# Patient Record
Sex: MALE | Race: WHITE | NOT HISPANIC OR LATINO | Employment: FULL TIME | URBAN - METROPOLITAN AREA
[De-identification: names, ages, dates, MRNs, and addresses within clinical notes are randomized per-mention and may not be internally consistent; named-entity substitution may affect disease eponyms.]

---

## 2022-08-31 ENCOUNTER — HOSPITAL ENCOUNTER (EMERGENCY)
Facility: CLINIC | Age: 22
Discharge: HOME | End: 2022-08-31
Attending: EMERGENCY MEDICINE | Admitting: EMERGENCY MEDICINE
Payer: COMMERCIAL

## 2022-08-31 VITALS
DIASTOLIC BLOOD PRESSURE: 82 MMHG | OXYGEN SATURATION: 96 % | BODY MASS INDEX: 23.72 KG/M2 | TEMPERATURE: 97.7 F | HEART RATE: 76 BPM | SYSTOLIC BLOOD PRESSURE: 136 MMHG | RESPIRATION RATE: 19 BRPM | WEIGHT: 205 LBS | HEIGHT: 78 IN

## 2022-08-31 DIAGNOSIS — F41.0 ANXIETY ATTACK: ICD-10-CM

## 2022-08-31 PROCEDURE — 99283 EMERGENCY DEPT VISIT LOW MDM: CPT

## 2022-08-31 PROCEDURE — 93005 ELECTROCARDIOGRAM TRACING: CPT | Performed by: EMERGENCY MEDICINE

## 2022-08-31 RX ORDER — HYDROXYZINE PAMOATE 50 MG/1
50 CAPSULE ORAL 3 TIMES DAILY PRN
Qty: 15 CAPSULE | Refills: 0 | Status: SHIPPED | OUTPATIENT
Start: 2022-08-31 | End: 2023-06-06

## 2022-09-01 LAB
ATRIAL RATE - MUSE: 64 BPM
DIASTOLIC BLOOD PRESSURE - MUSE: NORMAL MMHG
INTERPRETATION ECG - MUSE: NORMAL
P AXIS - MUSE: 53 DEGREES
PR INTERVAL - MUSE: 150 MS
QRS DURATION - MUSE: 98 MS
QT - MUSE: 442 MS
QTC - MUSE: 455 MS
R AXIS - MUSE: 42 DEGREES
SYSTOLIC BLOOD PRESSURE - MUSE: NORMAL MMHG
T AXIS - MUSE: 2 DEGREES
VENTRICULAR RATE- MUSE: 64 BPM

## 2022-09-01 NOTE — ED TRIAGE NOTES
Pt presents with c/o panic attack from increased stressors in life. Pt is not seeking treatment for anxiety. ABCs intact.      Triage Assessment     Row Name 08/31/22 1950       Triage Assessment (Adult)    Airway WDL WDL       Respiratory WDL    Respiratory WDL WDL       Skin Circulation/Temperature WDL    Skin Circulation/Temperature WDL WDL       Cardiac WDL    Cardiac WDL WDL       Peripheral/Neurovascular WDL    Peripheral Neurovascular WDL WDL       Cognitive/Neuro/Behavioral WDL    Cognitive/Neuro/Behavioral WDL WDL    Row Name 08/31/22 1947       Triage Assessment (Adult)    Airway WDL WDL       Respiratory WDL    Respiratory WDL WDL       Skin Circulation/Temperature WDL    Skin Circulation/Temperature WDL WDL       Cardiac WDL    Cardiac WDL WDL       Peripheral/Neurovascular WDL    Peripheral Neurovascular WDL WDL       Cognitive/Neuro/Behavioral WDL    Cognitive/Neuro/Behavioral WDL WDL

## 2022-09-01 NOTE — DISCHARGE INSTRUCTIONS
You were seen in the emergency department at [facility] after an episode of breathing difficulties and numbness.  We do think you are episode was consistent with a panic attack.  Your EKG here was normal.  We are glad that you are feeling better.  If you have recurrent symptoms you can try taking hydroxyzine as needed.  Try to focus on slow steady breathing.  If you continue to have attacks like this it would be a good idea to discuss things with primary care further.  If you have any other immediate concerns like new severe chest pain, sustained difficulty breathing at rest, lightheadedness to the point of passing out, etc. we can reevaluate you at any time in the emergency department.

## 2022-09-01 NOTE — ED PROVIDER NOTES
EMERGENCY DEPARTMENT ENCOUNTER      NAME: Silvio Stone  AGE: 22 year old male  YOB: 2000  MRN: [id number]  EVALUATION DATE & TIME: 2022  9:09 PM    PCP: No Ref-Primary, Physician    ED PROVIDER: Darryl Wisdom M.D.      Chief Complaint   Patient presents with     Panic Attack       FINAL IMPRESSION:  1. Anxiety attack        ED COURSE & MEDICAL DECISION MAKIN:24 PM I met with the patient, obtained history, performed an initial exam, and discussed options and plan for diagnostics and treatment here in the ED.   9:45 PM EKG unremarkable.  9:52 PM Patient ready for discharge.    22 year old male presents to the Emergency Department for evaluation of anxiety.  Patient is vitally stable and nontoxic-appearing when he arrives to the emergency department.  He has a history of 1 previous episode of what sounds like a panic attack.  Had a similar episode today in the setting of increased stress from work.  He arrives to the emergency department and his symptoms have essentially resolved.  His EKG shows sinus rhythm with no conduction disturbances.  Cardiopulmonary exam is unremarkable.  He feels completely better.  I do think his symptoms are consistent with a panic attack causing hyperventilation and paresthesias in his arms and legs.  Since he is completely well at this time, I do not suspect any other kind of acute cardiovascular or neurological process.  He was agreeable to trial of Vistaril at home for recurrent symptoms and follow-up in clinic.    At the conclusion of the encounter I discussed the results of all of the tests and the disposition. The questions were answered. The patient or family acknowledged understanding and was agreeable with the care plan.       MEDICATIONS GIVEN IN THE EMERGENCY:  Medications - No data to display    NEW PRESCRIPTIONS STARTED AT TODAY'S ER VISIT  Discharge Medication List as of 2022  9:52 PM      START taking these medications    Details  "  hydrOXYzine (VISTARIL) 50 MG capsule Take 1 capsule (50 mg) by mouth 3 times daily as needed for itching, Disp-15 capsule, R-0, Local Print                =================================================================    HPI    Patient information was obtained from: Patient    Use of : N/A         Silvio Stone is a 22 year old male with a pertinent history of a panic attack who presents to this ED via wheelchair for evaluation of a panic attack.    Patient reports a panic attack this evening caused by increased stressors in life and triggered by anxiety after work tonight. He notes having one other panic attack in his life that occurred within the last year. Patient reports tonight he had numbness in his hands and feet that has since gone away with shortness of breath from hyperventilating. He denies chest pain. Patient endorses palpitations during the episode. No alcohol or drug use. No other current complaints.      REVIEW OF SYSTEMS   All systems reviewed and negative except as noted in HPI.    PAST MEDICAL HISTORY:  No past medical history on file.    PAST SURGICAL HISTORY:  No past surgical history on file.        CURRENT MEDICATIONS:    No current facility-administered medications for this encounter.     Current Outpatient Medications   Medication     hydrOXYzine (VISTARIL) 50 MG capsule         ALLERGIES:  No Known Allergies    FAMILY HISTORY:  No family history on file.    SOCIAL HISTORY:   Social History     Socioeconomic History     Marital status: Single       VITALS:  /82   Pulse 76   Temp 97.7  F (36.5  C) (Temporal)   Resp 19   Ht 2.083 m (6' 10\")   Wt 93 kg (205 lb)   SpO2 96%   BMI 21.44 kg/m      PHYSICAL EXAM    Constitutional: Well developed, Well nourished, NAD.  HENT: Normocephalic, Atraumatic. Neck Supple.  Eyes: EOMI, Conjunctiva normal.  Respiratory: Breathing comfortably on room air. Speaks full sentences easily. Lungs clear to ascultation.  Cardiovascular: " Normal heart rate, Regular rhythm. No peripheral edema.  Abdomen: Soft, nontender  Musculoskeletal: Good range of motion in all major joints. No major deformities noted.  Integument: Warm, Dry.  Neurologic: Alert & awake, Normal motor function, Normal sensory function, No focal deficits noted.   Psychiatric: Cooperative. Affect appropriate.     LAB:  All pertinent labs reviewed and interpreted.  Labs Ordered and Resulted from Time of ED Arrival to Time of ED Departure - No data to display    RADIOLOGY:  Reviewed all pertinent imaging. Please see official radiology report.  No orders to display       EKG:    Performed at: 21:42    Impression: sinus rhythm    Rate: 64 bpm  Rhythm: sinus rhythm  Axis: 42  [place] Interval: 150 ms  QRS Interval: 98 ms  QTc Interval: 455 ms  ST Changes: none  Comparison: no previous ECGs available.    I have independently reviewed and interpreted the EKG(s) documented above.      I, Rosette Baird, am serving as a scribe to document services personally performed by Dr. Darryl Wisdom, based on my observation and the provider's statements to me. I, Darryl Wisdom MD attest that Rosette Baird is acting in a scribe capacity, has observed my performance of the services and has documented them in accordance with my direction.    Darryl Wisdom M.D.  Emergency Medicine  [facility] EMERGENCY ROOM  [address] [facility] [address] [phone]  Dept: [phone]     Darryl Wisdom MD  08/31/22 5534

## 2022-09-28 ENCOUNTER — OFFICE VISIT (OUTPATIENT)
Dept: FAMILY MEDICINE | Facility: CLINIC | Age: 22
End: 2022-09-28
Payer: COMMERCIAL

## 2022-09-28 VITALS
WEIGHT: 214 LBS | BODY MASS INDEX: 28.36 KG/M2 | RESPIRATION RATE: 18 BRPM | OXYGEN SATURATION: 98 % | HEIGHT: 73 IN | TEMPERATURE: 98 F | SYSTOLIC BLOOD PRESSURE: 124 MMHG | DIASTOLIC BLOOD PRESSURE: 82 MMHG | HEART RATE: 76 BPM

## 2022-09-28 DIAGNOSIS — Z00.00 ANNUAL PHYSICAL EXAM: Primary | ICD-10-CM

## 2022-09-28 DIAGNOSIS — Z13.220 SCREENING FOR HYPERLIPIDEMIA: ICD-10-CM

## 2022-09-28 DIAGNOSIS — F41.9 ANXIETY: ICD-10-CM

## 2022-09-28 LAB
CHOLEST SERPL-MCNC: 157 MG/DL
HDLC SERPL-MCNC: 53 MG/DL
LDLC SERPL CALC-MCNC: 94 MG/DL
NONHDLC SERPL-MCNC: 104 MG/DL
TRIGL SERPL-MCNC: 49 MG/DL

## 2022-09-28 PROCEDURE — 80061 LIPID PANEL: CPT | Performed by: PHYSICIAN ASSISTANT

## 2022-09-28 PROCEDURE — 36415 COLL VENOUS BLD VENIPUNCTURE: CPT | Performed by: PHYSICIAN ASSISTANT

## 2022-09-28 PROCEDURE — 99385 PREV VISIT NEW AGE 18-39: CPT | Performed by: PHYSICIAN ASSISTANT

## 2022-09-28 ASSESSMENT — PATIENT HEALTH QUESTIONNAIRE - PHQ9
10. IF YOU CHECKED OFF ANY PROBLEMS, HOW DIFFICULT HAVE THESE PROBLEMS MADE IT FOR YOU TO DO YOUR WORK, TAKE CARE OF THINGS AT HOME, OR GET ALONG WITH OTHER PEOPLE: SOMEWHAT DIFFICULT
SUM OF ALL RESPONSES TO PHQ QUESTIONS 1-9: 14
SUM OF ALL RESPONSES TO PHQ QUESTIONS 1-9: 14

## 2022-09-28 ASSESSMENT — ENCOUNTER SYMPTOMS
HEARTBURN: 0
HEADACHES: 0
EYE PAIN: 0
SORE THROAT: 0
PARESTHESIAS: 0
FREQUENCY: 0
HEMATURIA: 0
CHILLS: 0
ARTHRALGIAS: 1
ABDOMINAL PAIN: 0
HEMATOCHEZIA: 0
NERVOUS/ANXIOUS: 1
DIARRHEA: 0
SHORTNESS OF BREATH: 0
NAUSEA: 0
MYALGIAS: 0
FEVER: 0
DYSURIA: 0
DIZZINESS: 0
COUGH: 0
WEAKNESS: 0
CONSTIPATION: 0
PALPITATIONS: 0
JOINT SWELLING: 0

## 2022-09-28 ASSESSMENT — ANXIETY QUESTIONNAIRES
GAD7 TOTAL SCORE: 12
GAD7 TOTAL SCORE: 12
6. BECOMING EASILY ANNOYED OR IRRITABLE: NEARLY EVERY DAY
2. NOT BEING ABLE TO STOP OR CONTROL WORRYING: MORE THAN HALF THE DAYS
7. FEELING AFRAID AS IF SOMETHING AWFUL MIGHT HAPPEN: SEVERAL DAYS
1. FEELING NERVOUS, ANXIOUS, OR ON EDGE: MORE THAN HALF THE DAYS
IF YOU CHECKED OFF ANY PROBLEMS ON THIS QUESTIONNAIRE, HOW DIFFICULT HAVE THESE PROBLEMS MADE IT FOR YOU TO DO YOUR WORK, TAKE CARE OF THINGS AT HOME, OR GET ALONG WITH OTHER PEOPLE: SOMEWHAT DIFFICULT
8. IF YOU CHECKED OFF ANY PROBLEMS, HOW DIFFICULT HAVE THESE MADE IT FOR YOU TO DO YOUR WORK, TAKE CARE OF THINGS AT HOME, OR GET ALONG WITH OTHER PEOPLE?: SOMEWHAT DIFFICULT
4. TROUBLE RELAXING: MORE THAN HALF THE DAYS
GAD7 TOTAL SCORE: 12
7. FEELING AFRAID AS IF SOMETHING AWFUL MIGHT HAPPEN: SEVERAL DAYS
3. WORRYING TOO MUCH ABOUT DIFFERENT THINGS: MORE THAN HALF THE DAYS
5. BEING SO RESTLESS THAT IT IS HARD TO SIT STILL: NOT AT ALL

## 2022-09-28 ASSESSMENT — PAIN SCALES - GENERAL: PAINLEVEL: NO PAIN (0)

## 2022-09-28 NOTE — PROGRESS NOTES
"  {PROVIDER CHARTING PREFERENCE:714010}    Subjective   Silvio is a 22 year old{ACCOMPANIED BY STATEMENT (Optional):619370}, presenting for the following health issues:  Physical      HPI   {ALERT  Recent PHQ-9 score indicates suicidal ideations :857501}{Provider Documentation  Link to C-SSRS (Brief [place]) Flowsheet :271200}  {SUPERLIST (Optional):884788}  {additonal problems for provider to add (Optional):033713}    Review of Systems   {ROS COMP (Optional):669481}      Objective    /82   Pulse 76   Temp 98  F (36.7  C) (Oral)   Resp 18   Ht 1.854 m (6' 1\")   Wt 97.1 kg (214 lb)   SpO2 98%   BMI 28.23 kg/m    Body mass index is 28.23 kg/m .  Physical Exam   {Exam List (Optional):286693}    {Diagnostic Test Results (Optional):453153}    {AMBULATORY ATTESTATION (Optional):954647}            Answers for HPI/ROS submitted by the patient on 9/28/2022  If you checked off any problems, how difficult have these problems made it for you to do your work, take care of things at home, or get along with other people?: Somewhat difficult  PHQ9 TOTAL SCORE: 14  ALEXANDRA 7 TOTAL SCORE: 12      "

## 2022-09-28 NOTE — PROGRESS NOTES
SUBJECTIVE:   CC: Silvio is an 22 year old who presents for preventative health visit.       Patient has been advised of split billing requirements and indicates understanding: Yes  Silvio is a pleasant 22-year-old male that presents to the clinic today for an annual physical.  He is due for lipid panel.  He had a CBC and CMP checked in October 2021.  He also has a complaint of anxiety he was recently in the ER on August 31 for a panic attack.  It is a second panic attack that he has ever had his first 1 was in October 2021.  He feels his anxiety started as a freshman in college and has been slowly building ever since.  He notices anxiety worsening when in social situations or around a bunch of people.  He has also noticed a tic when he gets anxious he starts to nod his head in an upward manner repeatedly.  This does not happen very often and only occurs during times of high stress or when excited while drinking alcohol.  He is not on any anxiety medications currently he was given a prescription for hydroxyzine in the ER but has not picked it up yet.      Healthy Habits:     Getting at least 3 servings of Calcium per day:  Yes    Bi-annual eye exam:  Yes    Dental care twice a year:  Yes    Sleep apnea or symptoms of sleep apnea:  None    Diet:  Regular (no restrictions)    Frequency of exercise:  None    Taking medications regularly:  Yes    Medication side effects:  Not applicable    PHQ-2 Total Score: 3    Additional concerns today:  No    Today's PHQ-2 Score:   PHQ-2 ( 1999 Pfizer) 9/28/2022   Q1: Little interest or pleasure in doing things 1   Q2: Feeling down, depressed or hopeless 2   PHQ-2 Score 3   Q1: Little interest or pleasure in doing things More than half the days   Q2: Feeling down, depressed or hopeless More than half the days   PHQ-2 Score 4       Abuse: Current or Past(Physical, Sexual or Emotional)- No  Do you feel safe in your environment? Yes    Have you ever done Advance Care Planning? (For  example, a Health Directive, POLST, or a discussion with a medical provider or your loved ones about your wishes): No, advance care planning information given to patient to review.  Advanced care planning was discussed at today's visit.    Social History     Tobacco Use     Smoking status: Never Smoker     Smokeless tobacco: Never Used   Substance Use Topics     Alcohol use: Yes     Alcohol/week: 9.0 - 10.0 standard drinks     Types: 9 - 10 Standard drinks or equivalent per week     If you drink alcohol do you typically have >3 drinks per day or >7 drinks per week? Yes      Alcohol Use 9/28/2022   Prescreen: >3 drinks/day or >7 drinks/week? Yes   Prescreen: >3 drinks/day or >7 drinks/week? -   AUDIT SCORE  13     AUDIT - Alcohol Use Disorders Identification Test - Reproduced from the World Health Organization Audit 2001 (Second Edition) 9/28/2022   1.  How often do you have a drink containing alcohol? 2 to 3 times a week   2.  How many drinks containing alcohol do you have on a typical day when you are drinking? 3 or 4   3.  How often do you have five or more drinks on one occasion? Monthly   4.  How often during the last year have you found that you were not able to stop drinking once you had started? Less than monthly   5.  How often during the last year have you failed to do what was normally expected of you because of drinking? Never   6.  How often during the last year have you needed a first drink in the morning to get yourself going after a heavy drinking session? Never   7.  How often during the last year have you had a feeling of guilt or remorse after drinking? Monthly   8.  How often during the last year have you been unable to remember what happened the night before because of your drinking? Never   9.  Have you or someone else been injured because of your drinking? No   10. Has a relative, friend, doctor or other health care worker been concerned about your drinking or suggested you cut down? Yes,  during the last year   TOTAL SCORE 13     ALEXANDRA-7 SCORE 9/28/2022   Total Score 12 (moderate anxiety)   Total Score 12         PATIENT HEALTH QUESTIONNAIRE-9 (PHQ - 9)    Over the last 2 weeks, how often have you been bothered by any of the following problems?    1. Little interest or pleasure in doing things -  More than half the days   2. Feeling down, depressed, or hopeless -  More than half the days   3. Trouble falling or staying asleep, or sleeping too much - Several days   4. Feeling tired or having little energy -  Several days   5. Poor appetite or overeating -  Nearly every day   6. Feeling bad about yourself - or that you are a failure or have let yourself or your family down -  More than half the days   7. Trouble concentrating on things, such as reading the newspaper or watching television - Not at all   8. Moving or speaking so slowly that other people could have noticed? Or the opposite - being so fidgety or restless that you have been moving around a lot more than usual More than half the days   9. Thoughts that you would be better off dead or of hurting  yourself in some way Several days   Total Score: 14     If you checked off any problems, how difficult have these problems made it for you to do your work, take care of things at home, or get along with other people?      Developed by Adri Hernandez, Stephanie Wynn, Praveen Day and colleagues, with an educational danny from [employer] No permission required to reproduce, translate, display or distribute. permission required to reproduce, translate, display or distribute.    Last PSA: No results found for: PSA    Reviewed orders with patient. Reviewed health maintenance and updated orders accordingly - Yes      Reviewed and updated as needed this visit by clinical staff   Tobacco  Allergies  Meds                Reviewed and updated as needed this visit by Provider                   No past medical history on file.   No past surgical  "history on file.  OB History   No obstetric history on file.       Review of Systems   Constitutional: Negative for chills and fever.   HENT: Negative for congestion, ear pain, hearing loss and sore throat.    Eyes: Negative for pain and visual disturbance.   Respiratory: Negative for cough and shortness of breath.    Cardiovascular: Negative for chest pain, palpitations and peripheral edema.   Gastrointestinal: Negative for abdominal pain, constipation, diarrhea, heartburn, hematochezia and nausea.   Genitourinary: Negative for dysuria, frequency, genital sores, hematuria, impotence, penile discharge and urgency.   Musculoskeletal: Positive for arthralgias. Negative for joint swelling and myalgias.   Skin: Negative for rash.   Neurological: Negative for dizziness, weakness, headaches and paresthesias.   Psychiatric/Behavioral: Positive for mood changes. The patient is nervous/anxious.          OBJECTIVE:   /82   Pulse 76   Temp 98  F (36.7  C) (Oral)   Resp 18   Ht 1.854 m (6' 1\")   Wt 97.1 kg (214 lb)   SpO2 98%   BMI 28.23 kg/m      Physical Exam  Vitals reviewed.   Constitutional:       Appearance: He is normal weight.   HENT:      Head: Normocephalic and atraumatic.      Right Ear: Tympanic membrane, ear canal and external ear normal.      Left Ear: Tympanic membrane, ear canal and external ear normal.      Mouth/Throat:      Mouth: Mucous membranes are moist.      Pharynx: Oropharynx is clear. No oropharyngeal exudate or posterior oropharyngeal erythema.   Cardiovascular:      Rate and Rhythm: Normal rate and regular rhythm.      Heart sounds: Normal heart sounds, S1 normal and S2 normal. Heart sounds not distant. No murmur heard.    No friction rub. No gallop.   Pulmonary:      Effort: Pulmonary effort is normal.      Breath sounds: Normal breath sounds. No decreased breath sounds, wheezing, rhonchi or rales.   Abdominal:      General: Abdomen is flat. Bowel sounds are normal.      Palpations: " Abdomen is soft.   Musculoskeletal:      Right lower leg: No edema.      Left lower leg: No edema.   Skin:     General: Skin is warm and dry.   Neurological:      Mental Status: He is alert.   Psychiatric:         Mood and Affect: Mood normal.         Behavior: Behavior normal.         Thought Content: Thought content normal.         Judgment: Judgment normal.               ASSESSMENT/PLAN:       ICD-10-CM    1. Annual physical exam  Z00.00 Lipid panel reflex to direct LDL Fasting     Lipid panel reflex to direct LDL Fasting   2. Anxiety  F41.9 Adult Mental Health  Referral   3. Screening for hyperlipidemia  Z13.220 Lipid panel reflex to direct LDL Fasting     Lipid panel reflex to direct LDL Fasting     #1 annual physical exam-  22-year-old male physical.  Health maintenance was discussed.  Guidance was given.  Labs updated.  Discussed alcohol use and to monitor intake and to not use it to cope with anxiety.    #2 anxiety-  PHQ-14  ALEXANDRA-12  He thinks his anxiety for started freshman year of college and feels that it is slowly been building.  He notices that he is most anxious in social situations or when he is around a bunch of people.  He had his first panic attack in October 2021.  His only other panic attack happened 8/31/2021 where he went to the ER for anxiety he was given hydroxyzine at the time and had an EKG which came back normal.  He has not filled his prescription of hydroxyzine since leaving the pharmacy he was encouraged to do this today but warned that this can cause some drowsiness and to take it carefully if he is going to be operating heavy machinery or vehicle.  He also mentioned that he has a tic when he gets really anxious.  He notices that he starts to nod his head in an upward direction repeatedly as the anxiety worsens, it comes and goes, seems to be exacerbated while drinking alcohol, when getting excited about things, or thinking about it too much.  This does not occur frequently.   "Did discuss a neurology referral if this is starting to worsen or becomes more frequent.  On his PHQ-9 questionnaire today he did have a positive answer for thoughts of self-harm.  When discussed with the patient he has no plan no intent and says that he never would do it, more so just intrusive thoughts. He is to be seen if this worsens.  When discussing treatment options for the patient he would not like to start any medications at this time.  He is willing to start counseling and referral for mental health counseling will be placed.  Patient was informed that getting into counseling may take a couple of months so in the meantime he should refill his hydroxyzine and use that if needed.  Also informed that if he has any thoughts of self-harm that he should call 911 or go in to the ER.    #3 screening for hyperlipidemia-  Lipid panel will be obtained today    Patient has been advised of split billing requirements and indicates understanding: Yes    COUNSELING:   Reviewed preventive health counseling, as reflected in patient instructions       Regular exercise       Healthy diet/nutrition       Vision screening    Estimated body mass index is 28.23 kg/m  as calculated from the following:    Height as of this encounter: 1.854 m (6' 1\").    Weight as of this encounter: 97.1 kg (214 lb).     Weight management plan: Discussed healthy diet and exercise guidelines    He reports that he has never smoked. He has never used smokeless tobacco.      Counseling Resources:  ATP IV Guidelines  Pooled Cohorts Equation Calculator  FRAX Risk Assessment  ICSI Preventive Guidelines  Dietary Guidelines for Americans, 2010  USDA's MyPlate  ASA Prophylaxis  Lung CA Screening    Poli Torres PA-C  [facility]  "

## 2022-10-03 ENCOUNTER — HEALTH MAINTENANCE LETTER (OUTPATIENT)
Age: 22
End: 2022-10-03

## 2022-10-04 ASSESSMENT — PATIENT HEALTH QUESTIONNAIRE - PHQ9
10. IF YOU CHECKED OFF ANY PROBLEMS, HOW DIFFICULT HAVE THESE PROBLEMS MADE IT FOR YOU TO DO YOUR WORK, TAKE CARE OF THINGS AT HOME, OR GET ALONG WITH OTHER PEOPLE: SOMEWHAT DIFFICULT
SUM OF ALL RESPONSES TO PHQ QUESTIONS 1-9: 13
SUM OF ALL RESPONSES TO PHQ QUESTIONS 1-9: 13

## 2022-10-05 ENCOUNTER — VIRTUAL VISIT (OUTPATIENT)
Dept: PSYCHOLOGY | Facility: CLINIC | Age: 22
End: 2022-10-05
Attending: PHYSICIAN ASSISTANT
Payer: COMMERCIAL

## 2022-10-05 DIAGNOSIS — F41.1 GENERALIZED ANXIETY DISORDER: ICD-10-CM

## 2022-10-05 DIAGNOSIS — F32.1 MAJOR DEPRESSIVE DISORDER, SINGLE EPISODE, MODERATE (H): Primary | ICD-10-CM

## 2022-10-05 PROCEDURE — 90791 PSYCH DIAGNOSTIC EVALUATION: CPT | Mod: GT | Performed by: COUNSELOR

## 2022-10-05 ASSESSMENT — COLUMBIA-SUICIDE SEVERITY RATING SCALE - C-SSRS
2. HAVE YOU ACTUALLY HAD ANY THOUGHTS OF KILLING YOURSELF IN THE PAST MONTH?: YES
6. HAVE YOU EVER DONE ANYTHING, STARTED TO DO ANYTHING, OR PREPARED TO DO ANYTHING TO END YOUR LIFE?: NO
1. IN THE PAST MONTH, HAVE YOU WISHED YOU WERE DEAD OR WISHED YOU COULD GO TO SLEEP AND NOT WAKE UP?: YES
1. IN THE PAST MONTH, HAVE YOU WISHED YOU WERE DEAD OR WISHED YOU COULD GO TO SLEEP AND NOT WAKE UP?: YES
2. HAVE YOU ACTUALLY HAD ANY THOUGHTS OF KILLING YOURSELF?: YES
5. HAVE YOU STARTED TO WORK OUT OR WORKED OUT THE DETAILS OF HOW TO KILL YOURSELF? DO YOU INTEND TO CARRY OUT THIS PLAN?: NO
1. IN THE PAST MONTH, HAVE YOU WISHED YOU WERE DEAD OR WISHED YOU COULD GO TO SLEEP AND NOT WAKE UP?: YES
ATTEMPT LIFETIME: NO
1. HAVE YOU WISHED YOU WERE DEAD OR WISHED YOU COULD GO TO SLEEP AND NOT WAKE UP?: YES
4. HAVE YOU HAD THESE THOUGHTS AND HAD SOME INTENTION OF ACTING ON THEM?: NO
2. HAVE YOU ACTUALLY HAD ANY THOUGHTS OF KILLING YOURSELF?: YES
4. HAVE YOU HAD THESE THOUGHTS AND HAD SOME INTENTION OF ACTING ON THEM?: NO
4. HAVE YOU HAD THESE THOUGHTS AND HAD SOME INTENTION OF ACTING ON THEM?: NO

## 2022-10-05 NOTE — PROGRESS NOTES
[facility]  Provider Name:  Karine Wynn     Credentials:  LICSW, LADC    PATIENT'S NAME: Silvio Stone  PREFERRED NAME: Silvio  PRONOUNS: he/him  MRN: [id number]  : 2000  ADDRESS: [address] [place] [address]  [facility]  [id number]  DATE OF SERVICE: 10/05/22  START TIME: 10:05  END TIME: 10:57  PREFERRED PHONE: [phone]  May we leave a program related message: Yes  SERVICE MODALITY:  Video Visit:      Provider verified identity through the following two step process.  Patient provided:  Patient  and Patient address    Telemedicine Visit: The patient's condition can be safely assessed and treated via synchronous audio and visual telemedicine encounter.      Reason for Telemedicine Visit: Patient has requested telehealth visit    Originating Site (Patient Location): Patient's home    Distant Site (Provider Location): Provider Remote Setting- Home Office    Consent:  The patient/guardian has verbally consented to: the potential risks and benefits of telemedicine (video visit) versus in person care; bill my insurance or make self-payment for services provided; and responsibility for payment of non-covered services.     Patient would like the video invitation sent by:  My Chart    Mode of Communication:  Video Conference via [employer]    As the provider I attest to compliance with applicable laws and regulations related to telemedicine.    UNIVERSAL ADULT Mental Health DIAGNOSTIC ASSESSMENT    Identifying Information:  Patient is a 22 year old,   individual.    Patient was referred for an assessment by referring provider.  Patient attended the session alone.    Chief Complaint:   Patient reported increasing mental symptoms since he was a freshman in college. Patient reported having a panic in 10/21 and . Patient reported he has used alcohol and cannabis in an attempt to manage his symptoms but has concerns about the frequency and amount of his use. He has  "decreased his alcohol use to weekends and quit using cannabis about two weeks ago. Patient reported he has been having thoughts of being better off dead due and a sense of foreshortened future. Patient reported the suicidal ideations happen more when he is drinking and eventually pass on their own.     Social/Family History:  Patient reported they grew up in [place], [place].  They were raised by biological parents who  stayed . Patient has one sibling. Patient reported that their childhood was overall good. Patient reported some resentment towards his dad for not helping him develop better self-esteem and coping skills.  Patient described their current relationships with family of origin as good.      The patient describes their cultural background as: none identified.  Cultural influences and impact on patient's life structure, values, norms, and healthcare: none identified.  Contextual influences on patient's health include: none identified.  These factors will be addressed in the preliminary treatment plan as deemed relevant by the patient.  Patient identified their preferred language to be English. Patient reported they do not  need the assistance of an  or other support involved in therapy.     Patient reported had no significant delays in developmental tasks.   Patient's highest education level was college graduate. Patient identified the following learning problems: none reported.  Modifications will not be used to assist communication in therapy.   Patient reports they are  able to understand written materials.    Patient's current relationship status is partnered / significant other for 4 years. He reported \"Joy\" as supportive and that their relationship is great.   Patient identified their sexual orientation as heterosexual.  Patient reported having no child(sheri). Patient identified partner and parents as part of their support system.  Patient identified the quality of these " relationships as good.     Patient's current living/housing situation involves staying with family.  They live with their grandparents and they report that housing is stable.     Patient is currently employed full time and reports they are able to function appropriately at work.  Patient reports their finances are obtained through employment.  Patient does identify finances as a current stressor.      Patient reported that they have not been involved with the legal system.       Patient's Strengths and Limitations:  Patient identified the following strengths or resources that will help them succeed in treatment: commitment to health and well being, insight, intelligence, work ethic and honest, makes peoeple feel comfortable, good communicator. Things that may interfere with the patient's success in treatment include: none identified.     Assessments:  The following assessments were completed by patient for this visit:  PHQ2:   PHQ-2 ( 1999 [employer]) 9/28/2022 9/28/2022   Q1: Little interest or pleasure in doing things 1 2   Q2: Feeling down, depressed or hopeless 2 2   PHQ-2 Score 3 4   Q1: Little interest or pleasure in doing things More than half the days Several days   Q2: Feeling down, depressed or hopeless More than half the days More than half the days   PHQ-2 Score 4 3     PHQ9:   PHQ-9 SCORE 9/28/2022 10/4/2022   PHQ-9 Total Score MyChart 14 (Moderate depression) 13 (Moderate depression)   PHQ-9 Total Score 14 13     GAD2:   ALEXANDRA-2 9/29/2022   Feeling nervous, anxious, or on edge 2   Not being able to stop or control worrying 2   ALEXANDRA-2 Total Score 4     GAD7:   ALEXANDRA-7 SCORE 9/28/2022   Total Score 12 (moderate anxiety)   Total Score 12     CAGE-AID:   CAGE-AID Total Score 9/29/2022   Total Score 3   Total Score MyChart 3 (A total score of 2 or greater is considered clinically significant)     PROMIS 10-Global Health (only subscores and total score):   PROMIS-10 Scores Only 9/29/2022   Global Mental Health Score  8   Global Physical Health Score 16   PROMIS TOTAL - SUBSCORES 24     Sherman Suicide Severity Rating Scale (Lifetime/Recent)  Sherman Suicide Severity Rating (Lifetime/Recent) 10/5/2022   Wish to be Dead (Lifetime) Yes   Q1 Wished to be Dead (Past Month) yes   Q2 Suicidal Thoughts (Past Month) yes   Q4 Suicidal Intent without Specific Plan no   Q5 Suicide Intent with Specific Plan no   Q6 Suicide Behavior (Lifetime) no   Level of Risk per Screen low risk   1. Wish to be Dead (Lifetime) 1   Wish to be Dead Description (Lifetime) several times last week   1. Wish to be Dead (Past 1 Month) 1   Wish to be Dead Description (Past 1 Month) several times last week   2. Non-Specific Active Suicidal Thoughts (Lifetime) 1   2. Non-Specific Active Suicidal Thoughts (Past 1 Month) 1   4. Active Suicidal Ideation with Some Intent to Act, Without Specific Plan (Lifetime) 0   4. Active Suicidal Ideation with Some Intent to Act, Without Specific Plan (Past 1 Month) 0   Actual Attempt (Lifetime) 0   Has subject engaged in non-suicidal self-injurious behavior? (Lifetime) 0   Calculated C-SSRS Risk Score (Lifetime/Recent) Low Risk       Personal and Family Medical History:  Patient does report a family history of mental health concerns.  Patient reports family history is not on file.     Patient does not report Mental Health Diagnosis or Treatment.      Patient has had a physical exam to rule out medical causes for current symptoms.  Date of last physical exam was 9/28/22. The patient has a [facility] Provider, who is named Poli Torres.  Patient reports no current medical concerns.  Patient denies any issues with pain.  There are not significant appetite / nutritional concerns / weight changes. These may include: no concerns. Patient reports the following sleep concerns:  Delayed sleep onset and early awakening.   Patient does report a history of head injuries but no official TBI diagnosis.     Patient reports not  taking any current medications    Medication Adherence:  Patient reports NA.    Patient Allergies:  No Known Allergies    Medical History:  No past medical history on file.      Current Mental Status Exam:   Appearance:  Appropriate    Eye Contact:  Good   Psychomotor:  Normal       Gait / station:  JENNIFFER  Attitude / Demeanor: Cooperative  Pleasant  Speech      Rate / Production: Normal/ Responsive      Volume:  Normal  volume      Language:  no problems  Mood:   Anxious  Normal  Affect:   Appropriate    Thought Content: Clear   Thought Process: Logical       Associations: No loosening of associations  Insight:   Good  and Fair   Judgment:  Intact   Orientation:  All  Attention/concentration: Good        Substance Use:  Patient did not report a family history of substance use concerns; see medical history section for details.  Patient has not received chemical dependency treatment in the past.  Patient has not ever been to detox.      Patient is not currently receiving any chemical dependency treatment. Patient reported the following problems as a result of their substance use:  none.    Patient reports using alcohol on Friday and Saturday (weekends) and has 4-5 drinks at a time. Patient report daily drinking when in college about which his girlfriend shared concerns and he saw as excessive.   Patient denies using tobacco.  Patient reports he quit vaping cannabis about two weeks ago and has been experiencing withdrawal symptoms. Prior to that he was vaping cannabis nightly after getting home from work to relax.   Patient currently uses caffeine.    Patient reported no other substance use.     Based on the positive CAGE score and clinical interview there  are not indications of drug or alcohol abuse.      Significant Losses / Trauma / Abuse / Neglect Issues:   Patient did not  serve in the .  There are indications or report of significant loss, trauma, abuse or neglect issues related to: are no indications and  client denies any losses, trauma, abuse, or neglect concerns.  Concerns for possible neglect are not present.     Safety Assessment:   Patient denies current homicidal ideation and behaviors.  Patient reported recent suicidal ideation with plan or intent and no history of suicide attempts.  Patient denied risk behaviors associated with substance use.  Patient denies any high risk behaviors associated with mental health symptoms.  Patient reports the following current concerns for their personal safety: None.  Patient reports there are not firearms in the house.         History of Safety Concerns:  Patient denied a history of homicidal ideation.     Patient denied a history of personal safety concerns.    Patient denied a history of assaultive behaviors.    Patient denied a history of sexual assault behaviors.     Patient denied a history of risk behaviors associated with substance use.  Patient denies any history of high risk behaviors associated with mental health symptoms.  Patient reports the following protective factors: forward or future oriented thinking; dedication to family or friends; safe and stable environment; regular sleep; effectively controls impulses; regular physical activity; sense of belonging; purpose; secure attachment; help seeking behaviors when distressed; abstinence from substances; daily obligations; structured day; effective problem solving skills; commitment to well being; sense of meaning; positive social skills; financial stability; strong sense of self worth or esteem; sense of personal control or determination    Risk Plan:  See Recommendations for Safety and Risk Management Plan    Review of Symptoms per patient report:  Depression: Change in sleep, Lack of interest, Excessive or inappropriate guilt, Change in energy level, Difficulties concentrating, Change in appetite, Psychomotor slowing or agitation, Suicidal ideation, Feelings of hopelessness, Feelings of helplessness, Low  self-worth, Ruminations, Irritability, Feeling sad, down, or depressed, Withdrawn, Frequent crying and Anger outbursts  Tavia:  Elevated mood, Irritability, Grandiosity, Racing thoughts, Hypersexual and Restlessness  Psychosis: No Symptoms  Anxiety: Excessive worry, Nervousness, Physical complaints, such as headaches, stomachaches, muscle tension, Social anxiety, Sleep disturbance, Psychomotor agitation, Ruminations, Poor concentration, Irritability and Anger outbursts  Panic:  Tremors, Shortness of breath, Tingling and Numbness  Post Traumatic Stress Disorder:  No Symptoms   Eating Disorder: No Symptoms  ADD / ADHD:  No symptoms  Conduct Disorder: No symptoms  Autism Spectrum Disorder: No symptoms  Obsessive Compulsive Disorder: No Symptoms    Patient reports the following compulsive behaviors and treatment history: none..      Diagnostic Criteria:   Generalized Anxiety Disorder  A. Excessive anxiety and worry about a number of events or activities (such as work or school performance).   B. The person finds it difficult to control the worry.  C. Select 3 or more symptoms (required for diagnosis). Only one item is required in children.   - Restlessness or feeling keyed up or on edge.    - Being easily fatigued.    - Difficulty concentrating or mind going blank.    - Irritability.    - Muscle tension.    - Sleep disturbance (difficulty falling or staying asleep, or restless unsatisfying sleep).   D. The focus of the anxiety and worry is not confined to features of an Axis I disorder.  E. The anxiety, worry, or physical symptoms cause clinically significant distress or impairment in social, occupational, or other important areas of functioning.   F. The disturbance is not due to the direct physiological effects of a substance (e.g., a drug of abuse, a medication) or a general medical condition (e.g., hyperthyroidism) and does not occur exclusively during a Mood Disorder, a Psychotic Disorder, or a Pervasive  Developmental Disorder. Major Depressive Disorder  CRITERIA (A-C) REPRESENT A MAJOR DEPRESSIVE EPISODE - SELECT THESE CRITERIA  A) Single episode - symptoms have been present during the same 2-week period and represent a change from previous functioning 5 or more symptoms (required for diagnosis)   - Depressed mood. Note: In children and adolescents, can be irritable mood.     - Diminished interest or pleasure in all, or almost all, activities.    - Decreased sleep.    - Psychomotor activity agitation.    - Fatigue or loss of energy.    - Feelings of worthlessness or inappropriate and excessive guilt.    - Diminished ability to think or concentrate, or indecisiveness.    - Recurrent thoughts of death (not just fear of dying), recurrent suicidal ideation without a specific plan, or a suicide attempt or a specific plan for committing suicide.   B) The symptoms cause clinically significant distress or impairment in social, occupational, or other important areas of functioning  C) The episode is not attributable to the physiological effects of a substance or to another medical condition  D) The occurence of major depressive episode is not better explained by other thought / psychotic disorders  E) There has never been a manic episode or hypomanic episode      Functional Status:  Patient reports the following functional impairments:  health maintenance, relationship(s), self-care and social interactions.     Nonprogrammatic care:  Patient is requesting basic services to address current mental health concerns.    Clinical Summary:  1. Reason for assessment: anxiety and depression.  2. Psychosocial, Cultural and Contextual Factors: none reported    3. Principal DSM5 Diagnoses  (Sustained by DSM5 Criteria Listed Above):   296.22 (F32.1)  Major Depressive Disorder, Single Episode, Moderate _  300.02 (F41.1) Generalized Anxiety Disorder.  4. Other Diagnoses that is relevant to services: Patient endorsed clinically significant  symptoms of suresh.   5. Provisional Diagnosis:  296.22 (F32.1)  Major Depressive Disorder, Single Episode, Moderate _  300.02 (F41.1) Generalized Anxiety Disorder as evidenced by DSM-5 criteria.  6. Prognosis: Expect Improvement.  7. Likely consequences of symptoms if not treated: worsening of symptoms.  8. Client strengths include:  caring, creative, educated, empathetic, employed, good listener, insightful, intelligent, motivated, open to learning, support of family, friends and providers, wants to learn, willing to ask questions, willing to relate to others and work history .     Recommendations:     1. Plan for Safety and Risk Management:   Safety and Risk: Recommended that patient call 911 or go to the local ED should there be a change in any of these risk factors..          Report to child / adult protection services was NA.     2. Patient's identified no culturally-specific mental health concerns.     3. Initial Treatment will focus on:    Anxiety - decrease intensity.     4. Resources/Service Plan:    services are not indicated.   Modifications to assist communication are not indicated.   Additional disability accommodations are not indicated.      5. Collaboration:   Collaboration / coordination of treatment will be initiated with the following  support professionals: none at this time..      6.  Referrals:   The following referral(s) will be initiated: none at this time.      A Release of Information has been obtained for the following: none..     Emergency Contact  was not obtained.     7. ALISIA:    ALISIA:  Discussed the general effects of drugs and alcohol on health and well-being. Provider gave patient printed information about the effects of chemical use on their health and well being. Recommendations:  Abstain from drinking alcohol or drink with trusted people.     8. Records:   Information in this assessment was obtained from the medical record and provided by patient who appears to be a good  historian.  Patient will have open access to their mental health medical record.        Provider Name/ Credentials:  Karine Wynn, LATOYA, [facility]  October 5, 2022  Note was reviewed and clinical supervision provided by Ethan Hobbs Psy.D, LP  October 7, 2022

## 2023-02-13 ENCOUNTER — OFFICE VISIT (OUTPATIENT)
Dept: FAMILY MEDICINE | Facility: CLINIC | Age: 23
End: 2023-02-13
Payer: COMMERCIAL

## 2023-02-13 VITALS
OXYGEN SATURATION: 98 % | BODY MASS INDEX: 28.1 KG/M2 | TEMPERATURE: 98 F | SYSTOLIC BLOOD PRESSURE: 120 MMHG | RESPIRATION RATE: 14 BRPM | DIASTOLIC BLOOD PRESSURE: 78 MMHG | WEIGHT: 212 LBS | HEIGHT: 73 IN | HEART RATE: 89 BPM

## 2023-02-13 DIAGNOSIS — J02.0 ACUTE STREPTOCOCCAL PHARYNGITIS: Primary | ICD-10-CM

## 2023-02-13 DIAGNOSIS — H10.9 BACTERIAL CONJUNCTIVITIS: ICD-10-CM

## 2023-02-13 PROCEDURE — 99213 OFFICE O/P EST LOW 20 MIN: CPT | Performed by: PHYSICIAN ASSISTANT

## 2023-02-13 RX ORDER — CEFDINIR 300 MG/1
300 CAPSULE ORAL 2 TIMES DAILY
Qty: 20 CAPSULE | Refills: 0 | Status: SHIPPED | OUTPATIENT
Start: 2023-02-13 | End: 2023-02-23

## 2023-02-13 RX ORDER — MOXIFLOXACIN 5 MG/ML
1 SOLUTION/ DROPS OPHTHALMIC 3 TIMES DAILY
Qty: 3 ML | Refills: 0 | Status: SHIPPED | OUTPATIENT
Start: 2023-02-13 | End: 2023-06-06

## 2023-02-13 ASSESSMENT — ENCOUNTER SYMPTOMS: SORE THROAT: 1

## 2023-02-13 ASSESSMENT — PATIENT HEALTH QUESTIONNAIRE - PHQ9
SUM OF ALL RESPONSES TO PHQ QUESTIONS 1-9: 1
10. IF YOU CHECKED OFF ANY PROBLEMS, HOW DIFFICULT HAVE THESE PROBLEMS MADE IT FOR YOU TO DO YOUR WORK, TAKE CARE OF THINGS AT HOME, OR GET ALONG WITH OTHER PEOPLE: NOT DIFFICULT AT ALL
SUM OF ALL RESPONSES TO PHQ QUESTIONS 1-9: 1

## 2023-02-13 NOTE — PROGRESS NOTES
Assessment & Plan       ICD-10-CM    1. Acute streptococcal pharyngitis  J02.0 cefdinir (OMNICEF) 300 MG capsule      2. Bacterial conjunctivitis  H10.9 moxifloxacin (VIGAMOX) 0.5 % ophthalmic solution     ofloxacin (FLOXIN) 0.3 % otic solution        #1 acute pharyngitis  I will start him on cefdinir 2 times a day for 10 days.  He is to take this with food.  Side effects of the medication were discussed.  Strep testing was deferred.    #2 bacterial conjunctivitis  We will start him on Vigamox 1 drop 3 times a day for bacterial conjunctivitis.  I do not have concerns for orbital cellulitis or preorbital cellulitis at this time.      No follow-ups on file.    SOC[facility]   Silvio is a 23 year old, presenting for the following health issues:  Pharyngitis (Sore throat x 1 week. Woke up this am with a crusted L eye. No other sx.)      Silvio is a pleasant 23-year-old male who presents to the clinic today for evaluation on sore throat and red left eye.  He has had a sore throat and nasal congestion for about a week.  He feels that the nasal congestion has improved but he continues to have mild sore throat.  He denies any fevers or chills.  Mild cough on occasion.    He is also noticed that his left eye has become red and had some green discharge from his eye.  He noticed this this morning when he woke up.  He notes some mild pain to the eye at times.  He is able to close his eye and move his eye around without any discomfort.  No blurred vision double vision.  He does not feel that he got anything in his eye.    Pharyngitis   Associated symptoms include congestion. Pertinent negatives include no headaches, no shortness of breath and no cough.   History of Present Illness       Reason for visit:  Cold like symptoms that arent showing signs of imrovement  Symptom onset:  3-7 days ago  Symptoms include:  Sore Throat, Crusty Eye  Symptom intensity:   "Moderate  Symptom progression:  Staying the same  Had these symptoms before:  No  What makes it worse:  I havent noticed  What makes it better:  Getting good sleep and cough medicine    He eats 2-3 servings of fruits and vegetables daily.He consumes 0 sweetened beverage(s) daily.He exercises with enough effort to increase his heart rate 9 or less minutes per day.  He exercises with enough effort to increase his heart rate 3 or less days per week.   He is taking medications regularly.    Today's PHQ-9         PHQ-9 Total Score: 1    PHQ-9 Q9 Thoughts of better off dead/self-harm past 2 weeks :   Not at all    How difficult have these problems made it for you to do your work, take care of things at home, or get along with other people: Not difficult at all           Review of Systems   Constitutional: Negative for chills, fatigue and fever.   HENT: Positive for congestion, rhinorrhea, sinus pain and sore throat.    Eyes: Positive for pain, discharge, redness and itching. Negative for photophobia and visual disturbance.   Respiratory: Negative for cough, shortness of breath and wheezing.    Cardiovascular: Negative for chest pain and palpitations.   Gastrointestinal: Negative.    Genitourinary: Negative.    Neurological: Negative for dizziness, light-headedness and headaches.            Objective    /78 (BP Location: Right arm, Patient Position: Sitting, Cuff Size: Adult Regular)   Pulse 89   Temp 98  F (36.7  C) (Oral)   Resp 14   Ht 1.854 m (6' 1\")   Wt 96.2 kg (212 lb)   SpO2 98%   BMI 27.97 kg/m    Body mass index is 27.97 kg/m .  Physical Exam  Vitals and nursing note reviewed.   Constitutional:       Appearance: Normal appearance.   HENT:      Head: Normocephalic and atraumatic.      Right Ear: Tympanic membrane, ear canal and external ear normal.      Left Ear: Tympanic membrane, ear canal and external ear normal.      Mouth/Throat:      Mouth: Mucous membranes are moist.      Tongue: No lesions.    "   Pharynx: Posterior oropharyngeal erythema present. No pharyngeal swelling or oropharyngeal exudate.      Tonsils: No tonsillar exudate or tonsillar abscesses. 1+ on the right. 1+ on the left.   Eyes:      General: Lids are normal.         Right eye: No discharge.         Left eye: No discharge.      Conjunctiva/sclera:      Right eye: Right conjunctiva is not injected.      Left eye: Left conjunctiva is injected.   Cardiovascular:      Rate and Rhythm: Normal rate and regular rhythm.      Heart sounds: S1 normal and S2 normal.   Pulmonary:      Effort: Pulmonary effort is normal.      Breath sounds: Normal breath sounds and air entry. No decreased breath sounds, wheezing, rhonchi or rales.   Skin:     General: Skin is warm.      Findings: No rash.   Neurological:      General: No focal deficit present.      Mental Status: He is alert and oriented to person, place, and time.      Cranial Nerves: No cranial nerve deficit.      Gait: Gait is intact.

## 2023-02-14 ENCOUNTER — TELEPHONE (OUTPATIENT)
Dept: FAMILY MEDICINE | Facility: CLINIC | Age: 23
End: 2023-02-14

## 2023-02-14 RX ORDER — OFLOXACIN 3 MG/ML
5 SOLUTION AURICULAR (OTIC) DAILY
Qty: 10 ML | Refills: 0 | Status: SHIPPED | OUTPATIENT
Start: 2023-02-14 | End: 2023-06-06

## 2023-02-14 ASSESSMENT — ENCOUNTER SYMPTOMS
FEVER: 0
COUGH: 0
SHORTNESS OF BREATH: 0
WHEEZING: 0
EYE ITCHING: 1
DIZZINESS: 0
LIGHT-HEADEDNESS: 0
CHILLS: 0
HEADACHES: 0
EYE DISCHARGE: 1
FATIGUE: 0
PALPITATIONS: 0
GASTROINTESTINAL NEGATIVE: 1
PHOTOPHOBIA: 0
EYE PAIN: 1
EYE REDNESS: 1
RHINORRHEA: 1
SINUS PAIN: 1

## 2023-02-14 NOTE — TELEPHONE ENCOUNTER
Per Pravin, asking to do a drug change request on Moxifloxacine    Preferred alternate is Ofloxacinedrop    Please call or send new RX with strength, directions and refills

## 2023-06-05 ENCOUNTER — NURSE TRIAGE (OUTPATIENT)
Dept: FAMILY MEDICINE | Facility: CLINIC | Age: 23
End: 2023-06-05
Payer: COMMERCIAL

## 2023-06-05 NOTE — TELEPHONE ENCOUNTER
"This patient has an appointment tomorrow with PCP.  Chest pain, shortness of breath and arm pain is intermittent and not in acute distress.    CONCHA Paige RN  [facility]    Nurse Triage SBAR    Is this a 2nd Level Triage? NO    Situation:   Patient has been having \"pain at top of his breath\" occasionally and then has some left arm pain. This has been intermittent for the past few months and has been increasing more lately.     Background:   States that this has been happening more since his treatment for bronchitis in February 2023.    Assessment:   Patient has an appointment scheduled with Poli Torres tomorrow. Patient is not in acute distress and pain has been intermittent.  Has no fever, no changes in vision or other pain.  Rates this pain, when it occurs 3-4/10.  Reports that the pain in the arm goes away and then the chest pain goes away.     Protocol Recommended Disposition:   No disposition on file.    Recommendation:   Is being seen in clinic on 6/6 with PCP.  Instructed to go in if symptoms change or worsen.  Patient verbalized understanding.         Routed to provider    Does the patient meet one of the following criteria for ADS visit consideration? 16+ years old, with an [facility] PCP     TIP  Providers, please consider if this condition is appropriate for management at one of our Acute and Diagnostic Services sites.     If patient is a good candidate, please use dotphrase <dot>triageresponse and select Refer to ADS to document.    Additional Information    Negative: SEVERE difficulty breathing (e.g., struggling for each breath, speaks in single words, pulse > 120)    Negative: Breathing stopped and hasn't returned    Negative: Choking on something    Negative: Bluish (or gray) lips or face    Negative: Difficult to awaken or acting confused (e.g., disoriented, slurred speech)    Negative: Passed out (i.e., fainted, collapsed and was not responding)    Negative: Wheezing " "started suddenly after medicine, an allergic food, or bee sting    Negative: Stridor    Negative: Slow, shallow and weak breathing    Negative: Sounds like a life-threatening emergency to the triager    Negative: Chest pain    Negative: Wheezing (high pitched whistling sound) and previous asthma attacks or use of asthma medicines    Negative: Difficulty breathing and within 14 days of COVID-19 Exposure    Negative: Difficulty breathing and only present when coughing    Negative: Difficulty breathing and only from stuffy nose    Negative: Difficulty breathing and only from stuffy nose or runny nose from common cold    Negative: MODERATE difficulty breathing (e.g., speaks in phrases, SOB even at rest, pulse 100-120) of new-onset or worse than normal    Negative: Oxygen level (e.g., pulse oximetry) 90 percent or lower    Negative: Wheezing can be heard across the room    Negative: Drooling or spitting out saliva (because can't swallow)    Negative: Any history of prior \"blood clot\" in leg or lungs    Negative: Illness requiring prolonged bedrest in past month (e.g., immobilization, long hospital stay)    Negative: Hip or leg fracture (broken bone) in past month (or had cast on leg or ankle in past month)    Negative: Major surgery in the past month    Negative: Long-distance travel in past month (e.g., car, bus, train, plane; with trip lasting 6 or more hours)    Negative: Cancer treatment in past six months (or has cancer now)    Negative: Extra heart beats OR irregular heart beating (i.e., \"palpitations\")    Negative: Fever > 103 F (39.4 C)    Negative: Fever > 101 F (38.3 C) and over 60 years of age    Negative: Fever > 100.0 F (37.8 C) and bedridden (e.g., nursing home patient, stroke, chronic illness, recovering from surgery)    Negative: Fever > 100.0 F (37.8 C) and diabetes mellitus or weak immune system (e.g., HIV positive, cancer chemo, splenectomy, organ transplant, chronic steroids)    Negative: Periods " "where breathing stops and then resumes normally and bedridden (e.g., nursing home patient, CVA)    Negative: Pregnant or postpartum (from 0 to 6 weeks after delivery)    Negative: Patient sounds very sick or weak to the triager    Negative: MILD difficulty breathing (e.g., minimal/no SOB at rest, SOB with walking, pulse < 100) of new-onset or worse than normal    Negative: Longstanding difficulty breathing (e.g., CHF, COPD, emphysema) and worse than normal    Negative: Longstanding difficulty breathing and not responding to usual therapy    Negative: Continuous (nonstop) coughing    Negative: Patient wants to be seen    Negative: Oxygen level (e.g., pulse oximetry) 91 to 94 percent    Negative: MODERATE longstanding difficulty breathing (e.g., speaks in phrases, SOB even at rest, pulse 100-120) and SAME as normal    Negative: MILD longstanding difficulty breathing (e.g., speaks in phrases, SOB even at rest, pulse 100-120) and SAME as normal    Answer Assessment - Initial Assessment Questions  1. RESPIRATORY STATUS: \"Describe your breathing?\" (e.g., wheezing, shortness of breath, unable to speak, severe coughing)       When breathing in deeply - hurts at top of breath.  Couple days it lasts and happeing more often.    2. ONSET: \"When did this breathing problem begin?\"       Few months  3. PATTERN \"Does the difficult breathing come and go, or has it been constant since it started?\"       Intermittent.  4. SEVERITY: \"How bad is your breathing?\" (e.g., mild, moderate, severe)     - MILD: No SOB at rest, mild SOB with walking, speaks normally in sentences, can lie down, no retractions, pulse < 100.     - MODERATE: SOB at rest, SOB with minimal exertion and prefers to sit, cannot lie down flat, speaks in phrases, mild retractions, audible wheezing, pulse 100-120.     - SEVERE: Very SOB at rest, speaks in single words, struggling to breathe, sitting hunched forward, retractions, pulse > 120       Does not impede from " "talking but does not stop him from.   5. RECURRENT SYMPTOM: \"Have you had difficulty breathing before?\" If Yes, ask: \"When was the last time?\" and \"What happened that time?\"       When the dyspnea happens has a shooting pain - goes away after a short time prior to the pain.  Feels like kind of hard to breath, then a dull ache.  Can only take shallow breaths. no  6. CARDIAC HISTORY: \"Do you have any history of heart disease?\" (e.g., heart attack, angina, bypass surgery, angioplasty)       no  7. LUNG HISTORY: \"Do you have any history of lung disease?\"  (e.g., pulmonary embolus, asthma, emphysema)      no  8. CAUSE: \"What do you think is causing the breathing problem?\"       Could be combination of diet, stress and lots going on in life right now.   9. OTHER SYMPTOMS: \"Do you have any other symptoms? (e.g., dizziness, runny nose, cough, chest pain, fever)      Did have bronchitis before and feels like this started after that.   10. O2 SATURATION MONITOR:  \"Do you use an oxygen saturation monitor (pulse oximeter) at home?\" If Yes, \"What is your reading (oxygen level) today?\" \"What is your usual oxygen saturation reading?\" (e.g., 95%)        no  11. PREGNANCY: \"Is there any chance you are pregnant?\" \"When was your last menstrual period?\"        n/a  12. TRAVEL: \"Have you traveled out of the country in the last month?\" (e.g., travel history, exposures)        no    Protocols used: BREATHING DIFFICULTY-A-OH    "

## 2023-06-06 ENCOUNTER — ANCILLARY PROCEDURE (OUTPATIENT)
Dept: GENERAL RADIOLOGY | Facility: CLINIC | Age: 23
End: 2023-06-06
Attending: PHYSICIAN ASSISTANT
Payer: COMMERCIAL

## 2023-06-06 ENCOUNTER — OFFICE VISIT (OUTPATIENT)
Dept: FAMILY MEDICINE | Facility: CLINIC | Age: 23
End: 2023-06-06
Payer: COMMERCIAL

## 2023-06-06 VITALS
DIASTOLIC BLOOD PRESSURE: 80 MMHG | BODY MASS INDEX: 27.43 KG/M2 | HEIGHT: 73 IN | SYSTOLIC BLOOD PRESSURE: 124 MMHG | WEIGHT: 207 LBS | OXYGEN SATURATION: 99 % | HEART RATE: 69 BPM | TEMPERATURE: 97.5 F | RESPIRATION RATE: 14 BRPM

## 2023-06-06 DIAGNOSIS — R07.89 ATYPICAL CHEST PAIN: Primary | ICD-10-CM

## 2023-06-06 DIAGNOSIS — R07.89 ATYPICAL CHEST PAIN: ICD-10-CM

## 2023-06-06 DIAGNOSIS — K21.00 GASTROESOPHAGEAL REFLUX DISEASE WITH ESOPHAGITIS WITHOUT HEMORRHAGE: ICD-10-CM

## 2023-06-06 LAB
ALBUMIN SERPL BCG-MCNC: 5 G/DL (ref 3.5–5.2)
ALP SERPL-CCNC: 59 U/L (ref 40–129)
ALT SERPL W P-5'-P-CCNC: 15 U/L (ref 10–50)
ANION GAP SERPL CALCULATED.3IONS-SCNC: 11 MMOL/L (ref 7–15)
AST SERPL W P-5'-P-CCNC: 21 U/L (ref 10–50)
ATRIAL RATE - MUSE: 63 BPM
BILIRUB SERPL-MCNC: 0.6 MG/DL
BUN SERPL-MCNC: 10.4 MG/DL (ref 6–20)
CALCIUM SERPL-MCNC: 9.8 MG/DL (ref 8.6–10)
CHLORIDE SERPL-SCNC: 104 MMOL/L (ref 98–107)
CREAT SERPL-MCNC: 0.88 MG/DL (ref 0.67–1.17)
CRP SERPL-MCNC: 3.02 MG/L
D DIMER PPP FEU-MCNC: <0.27 UG/ML FEU (ref 0–0.5)
DEPRECATED HCO3 PLAS-SCNC: 25 MMOL/L (ref 22–29)
DIASTOLIC BLOOD PRESSURE - MUSE: NORMAL MMHG
ERYTHROCYTE [DISTWIDTH] IN BLOOD BY AUTOMATED COUNT: 11.5 % (ref 10–15)
ERYTHROCYTE [SEDIMENTATION RATE] IN BLOOD BY WESTERGREN METHOD: 6 MM/HR (ref 0–15)
GFR SERPL CREATININE-BSD FRML MDRD: >90 ML/MIN/1.73M2
GLUCOSE SERPL-MCNC: 95 MG/DL (ref 70–99)
HCT VFR BLD AUTO: 43.5 % (ref 40–53)
HGB BLD-MCNC: 15.5 G/DL (ref 13.3–17.7)
INTERPRETATION ECG - MUSE: NORMAL
MCH RBC QN AUTO: 30.3 PG (ref 26.5–33)
MCHC RBC AUTO-ENTMCNC: 35.6 G/DL (ref 31.5–36.5)
MCV RBC AUTO: 85 FL (ref 78–100)
P AXIS - MUSE: 47 DEGREES
PLATELET # BLD AUTO: 230 10E3/UL (ref 150–450)
POTASSIUM SERPL-SCNC: 4.7 MMOL/L (ref 3.4–5.3)
PR INTERVAL - MUSE: 152 MS
PROT SERPL-MCNC: 7.6 G/DL (ref 6.4–8.3)
QRS DURATION - MUSE: 98 MS
QT - MUSE: 416 MS
QTC - MUSE: 425 MS
R AXIS - MUSE: 31 DEGREES
RBC # BLD AUTO: 5.11 10E6/UL (ref 4.4–5.9)
SODIUM SERPL-SCNC: 140 MMOL/L (ref 136–145)
SYSTOLIC BLOOD PRESSURE - MUSE: NORMAL MMHG
T AXIS - MUSE: 13 DEGREES
VENTRICULAR RATE- MUSE: 63 BPM
WBC # BLD AUTO: 3.8 10E3/UL (ref 4–11)

## 2023-06-06 PROCEDURE — 85652 RBC SED RATE AUTOMATED: CPT | Performed by: PHYSICIAN ASSISTANT

## 2023-06-06 PROCEDURE — 86140 C-REACTIVE PROTEIN: CPT | Performed by: PHYSICIAN ASSISTANT

## 2023-06-06 PROCEDURE — 99214 OFFICE O/P EST MOD 30 MIN: CPT | Performed by: PHYSICIAN ASSISTANT

## 2023-06-06 PROCEDURE — 85379 FIBRIN DEGRADATION QUANT: CPT | Performed by: PHYSICIAN ASSISTANT

## 2023-06-06 PROCEDURE — 85027 COMPLETE CBC AUTOMATED: CPT | Performed by: PHYSICIAN ASSISTANT

## 2023-06-06 PROCEDURE — 71046 X-RAY EXAM CHEST 2 VIEWS: CPT | Mod: TC | Performed by: RADIOLOGY

## 2023-06-06 PROCEDURE — 36415 COLL VENOUS BLD VENIPUNCTURE: CPT | Performed by: PHYSICIAN ASSISTANT

## 2023-06-06 PROCEDURE — 93005 ELECTROCARDIOGRAM TRACING: CPT | Performed by: PHYSICIAN ASSISTANT

## 2023-06-06 PROCEDURE — 93010 ELECTROCARDIOGRAM REPORT: CPT | Performed by: INTERNAL MEDICINE

## 2023-06-06 PROCEDURE — 80053 COMPREHEN METABOLIC PANEL: CPT | Performed by: PHYSICIAN ASSISTANT

## 2023-06-06 ASSESSMENT — ENCOUNTER SYMPTOMS
SHORTNESS OF BREATH: 1
MUSCULOSKELETAL NEGATIVE: 1
ABDOMINAL PAIN: 0
DIZZINESS: 0
STRIDOR: 0
VOMITING: 0
EYES NEGATIVE: 1
WHEEZING: 0
LIGHT-HEADEDNESS: 0
HEMATOCHEZIA: 0
CHEST TIGHTNESS: 0
HEARTBURN: 1
CHILLS: 0
NAUSEA: 0
CONSTIPATION: 0
HEADACHES: 0
FATIGUE: 0

## 2023-06-06 NOTE — PROGRESS NOTES
Assessment & Plan       ICD-10-CM    1. Atypical chest pain  R07.89 CBC with platelets     Comprehensive metabolic panel (BMP + Alb, Alk Phos, ALT, AST, Total. Bili, TP)     D dimer, quantitative     XR Chest 2 Views     EKG 12-lead, tracing only     omeprazole (PRILOSEC) 20 MG DR capsule     ESR: Erythrocyte sedimentation rate     CRP, inflammation     Echocardiogram Exercise Stress      2. Gastroesophageal reflux disease with esophagitis without hemorrhage  K21.00         #1 atypical chest pain  #2 pleuritic chest pain  #3 GERD  -CBC  -Complete metabolic panel  -Sed rate  -CRP  -D-dimer  .-EKG  -Chest x-ray  -Stress test  He has been having ongoing atypical/pleuritic chest pain since March.  Related after a bout of bronchitis.  He states when he takes a deep breath he has pain that is in the center of his chest that radiates down his left arm.  He denies any epigastric pain.  He states the pain comes and goes.  He states he had 4-5 bouts of this since March.  He denies any lightheaded or dizziness or palpitations.  He describes it as a sharp pain when it occurs.  He does not have any other risk factors for coronary artery disease.  He looks stable today and nontoxic.  Vitals are stable.  Due to the nature of his symptoms I would like to do further evaluation today.  We will get a CBC, complete metabolic panel D-dimer, CRP and sed rate and also set him up for a stress test.  Less likely suspect pericarditis but will get inflammatory markers to rule this out.  We will get a D-dimer to rule out PE again less likely.  We will also get a chest x-ray today and an EKG.  Other thought would be reflux as laying flat high sodium diet coffee makes it worse.  We will start him on omeprazole 20 mg once a day in the meantime.  He is to continue to monitor his symptoms closely.  Symptoms for prompt reevaluation were discussed.  He is to let me know in 2 weeks how the omeprazole is helping.    [id number]     BMI:   Estimated body  "mass index is 27.31 kg/m  as calculated from the following:    Height as of this encounter: 1.854 m (6' 1\").    Weight as of this encounter: 93.9 kg (207 lb).   Weight management plan: Discussed healthy diet and exercise guidelines      [facility]    Dion Anguiano is a 23 year old, presenting for the following health issues:  Chest Pain (Pt reports he was dx with bronchitis about month ago. Given round of abx and finished. Reports he has chest pain with deep breaths. No other sx.)        6/6/2023     9:47 AM   Additional Questions   Roomed by Ashley Calderón   Accompanied by none     Silvio is a pleasant 23-year-old male that presents to the clinic today for evaluation on chest pain.  He states in March he had a bout of bronchitis and ever since then he has been having chest pain in the center of his chest that radiates down his arm whenever he takes a deep breath.  He states that he has had 4-5 episodes of the symptoms since March.  He states he does get somewhat short of breath when this occurs.  He also states that higher sodium diets tends to make this worse.  He also notes laying flat tends to make this worse.  He denies any dizziness or lightheadedness or palpitations associated with the symptoms.  He states that activity does not precipitate any chest pain or shortness of breath.  Leaning forward does not cause any chest pain.  He did have bronchitis on the onset of his symptoms and states that he did not have significant coughing with this.  He states that the pain is not reproducible with palpation.  He appears nontoxic today.  Vitals are stable.  Otherwise exam was benign.    History of Present Illness       Reason for visit:  Heart pain. Pain in center of chest that shoots down left arm.  Symptom onset:  More than a month  Symptoms include:  Heart pain. Pain in center of chest that shoots down left arm.  Symptom intensity:  Mild  Symptom progression:  " "Worsening  Had these symptoms before:  Yes  Has tried/received treatment for these symptoms:  No  What makes it worse:  High Sodium foods. Alcohol. Caffeine.  What makes it better:  Letting it go away on its own.    He eats 2-3 servings of fruits and vegetables daily.He consumes 0 sweetened beverage(s) daily.He exercises with enough effort to increase his heart rate 9 or less minutes per day.  He exercises with enough effort to increase his heart rate 3 or less days per week.   He is taking medications regularly.         Review of Systems   Constitutional: Negative for chills and fatigue.   HENT: Negative.    Eyes: Negative.    Respiratory: Positive for shortness of breath. Negative for chest tightness, wheezing and stridor.    Cardiovascular: Positive for chest pain.   Gastrointestinal: Positive for heartburn. Negative for abdominal pain, constipation, hematochezia, nausea and vomiting.   Genitourinary: Negative.    Musculoskeletal: Negative.    Skin: Negative.    Neurological: Negative for dizziness, light-headedness and headaches.            Objective    /80 (BP Location: Right arm, Patient Position: Sitting, Cuff Size: Adult Regular)   Pulse 69   Temp 97.5  F (36.4  C) (Oral)   Resp 14   Ht 1.854 m (6' 1\")   Wt 93.9 kg (207 lb)   SpO2 99%   BMI 27.31 kg/m    Body mass index is 27.31 kg/m .  Physical Exam  Vitals and nursing note reviewed.   Constitutional:       Appearance: Normal appearance.   HENT:      Head: Normocephalic and atraumatic.   Eyes:      Conjunctiva/sclera: Conjunctivae normal.   Neck:      Vascular: No carotid bruit or JVD.      Trachea: Trachea normal.   Cardiovascular:      Rate and Rhythm: Normal rate and regular rhythm.      Heart sounds: No murmur heard.     No friction rub. No gallop.   Pulmonary:      Effort: Pulmonary effort is normal.      Breath sounds: No wheezing, rhonchi or rales.   Abdominal:      General: Abdomen is flat.      Tenderness: There is no abdominal " tenderness. There is no guarding or rebound.   Musculoskeletal:      Cervical back: Full passive range of motion without pain and neck supple.      Right lower leg: No edema.      Left lower leg: No edema.   Skin:     General: Skin is warm and dry.   Neurological:      General: No focal deficit present.      Mental Status: He is alert and oriented to person, place, and time.      Motor: No weakness.   Psychiatric:         Mood and Affect: Mood normal.         Behavior: Behavior normal.         Thought Content: Thought content normal.         Judgment: Judgment normal.

## 2023-06-06 NOTE — PATIENT INSTRUCTIONS
Please call the radiology dep at [facility] to schedule your test today at [phone]

## 2023-06-28 ENCOUNTER — HOSPITAL ENCOUNTER (OUTPATIENT)
Dept: CARDIOLOGY | Facility: CLINIC | Age: 23
Discharge: HOME | End: 2023-06-28
Attending: PHYSICIAN ASSISTANT | Admitting: PHYSICIAN ASSISTANT
Payer: COMMERCIAL

## 2023-06-28 DIAGNOSIS — R07.89 ATYPICAL CHEST PAIN: ICD-10-CM

## 2023-06-28 PROCEDURE — 93325 DOPPLER ECHO COLOR FLOW MAPG: CPT | Mod: 26 | Performed by: INTERNAL MEDICINE

## 2023-06-28 PROCEDURE — 93018 CV STRESS TEST I&R ONLY: CPT | Performed by: INTERNAL MEDICINE

## 2023-06-28 PROCEDURE — 93016 CV STRESS TEST SUPVJ ONLY: CPT | Performed by: INTERNAL MEDICINE

## 2023-06-28 PROCEDURE — 93321 DOPPLER ECHO F-UP/LMTD STD: CPT | Mod: 26 | Performed by: INTERNAL MEDICINE

## 2023-06-28 PROCEDURE — 93350 STRESS TTE ONLY: CPT | Mod: 26 | Performed by: INTERNAL MEDICINE

## 2023-06-28 PROCEDURE — 93350 STRESS TTE ONLY: CPT | Mod: TC

## 2023-06-28 PROCEDURE — 93325 DOPPLER ECHO COLOR FLOW MAPG: CPT | Mod: TC

## 2023-12-31 ENCOUNTER — HEALTH MAINTENANCE LETTER (OUTPATIENT)
Age: 23
End: 2023-12-31

## 2025-01-19 ENCOUNTER — HEALTH MAINTENANCE LETTER (OUTPATIENT)
Age: 25
End: 2025-01-19